# Patient Record
Sex: FEMALE | Race: WHITE
[De-identification: names, ages, dates, MRNs, and addresses within clinical notes are randomized per-mention and may not be internally consistent; named-entity substitution may affect disease eponyms.]

---

## 2017-07-19 ENCOUNTER — HOSPITAL ENCOUNTER (EMERGENCY)
Dept: HOSPITAL 10 - E/R | Age: 72
Discharge: HOME | End: 2017-07-19
Payer: MEDICARE

## 2017-07-19 VITALS
WEIGHT: 186.29 LBS | WEIGHT: 186.29 LBS | HEIGHT: 63 IN | BODY MASS INDEX: 33.01 KG/M2 | HEIGHT: 63 IN | BODY MASS INDEX: 33.01 KG/M2

## 2017-07-19 VITALS
SYSTOLIC BLOOD PRESSURE: 135 MMHG | TEMPERATURE: 98.1 F | RESPIRATION RATE: 18 BRPM | DIASTOLIC BLOOD PRESSURE: 60 MMHG | HEART RATE: 76 BPM

## 2017-07-19 DIAGNOSIS — N30.00: ICD-10-CM

## 2017-07-19 DIAGNOSIS — R10.30: Primary | ICD-10-CM

## 2017-07-19 DIAGNOSIS — I10: ICD-10-CM

## 2017-07-19 LAB
ADD SCAN DIFF: NO
ADD UMIC: YES
ALBUMIN SERPL-MCNC: 4.1 G/DL (ref 3.3–4.9)
ALBUMIN/GLOB SERPL: 0.95 {RATIO}
ALP SERPL-CCNC: 56 IU/L (ref 42–121)
ALT SERPL-CCNC: 19 IU/L (ref 13–69)
ANION GAP SERPL CALC-SCNC: 21 MMOL/L (ref 8–16)
AST SERPL-CCNC: 21 IU/L (ref 15–46)
BASOPHILS # BLD AUTO: 0 10^3/UL (ref 0–0.1)
BASOPHILS NFR BLD: 0.7 % (ref 0–2)
BILIRUB DIRECT SERPL-MCNC: 0 MG/DL (ref 0–0.2)
BILIRUB SERPL-MCNC: 0.3 MG/DL (ref 0.2–1.3)
BUN SERPL-MCNC: 17 MG/DL (ref 7–20)
CALCIUM SERPL-MCNC: 9.9 MG/DL (ref 8.4–10.2)
CHLORIDE SERPL-SCNC: 103 MMOL/L (ref 97–110)
CO2 SERPL-SCNC: 26 MMOL/L (ref 21–31)
COLOR UR: YELLOW
CREAT SERPL-MCNC: 1 MG/DL (ref 0.44–1)
EOSINOPHIL # BLD: 0.3 10^3/UL (ref 0–0.5)
EOSINOPHIL NFR BLD: 4.9 % (ref 0–7)
ERYTHROCYTE [DISTWIDTH] IN BLOOD BY AUTOMATED COUNT: 13.7 % (ref 11.5–14.5)
GLOBULIN SER-MCNC: 4.3 G/DL (ref 1.3–3.2)
GLUCOSE SERPL-MCNC: 133 MG/DL (ref 70–220)
GLUCOSE UR STRIP-MCNC: NEGATIVE MG/DL
HCT VFR BLD CALC: 39.1 % (ref 37–47)
HGB BLD-MCNC: 13 G/DL (ref 12–16)
KETONES UR STRIP.AUTO-MCNC: (no result) MG/DL
LYMPHOCYTES # BLD AUTO: 1.3 10^3/UL (ref 0.8–2.9)
LYMPHOCYTES NFR BLD AUTO: 21.5 % (ref 15–51)
MCH RBC QN AUTO: 29.5 PG (ref 29–33)
MCHC RBC AUTO-ENTMCNC: 33.2 G/DL (ref 32–37)
MCV RBC AUTO: 88.7 FL (ref 82–101)
MONOCYTES # BLD: 0.6 10^3/UL (ref 0.3–0.9)
MONOCYTES NFR BLD: 10.6 % (ref 0–11)
MUCOUS THREADS #/AREA URNS HPF: (no result) /HPF
NEUTROPHILS # BLD: 3.6 10^3/UL (ref 1.6–7.5)
NEUTROPHILS NFR BLD AUTO: 61.8 % (ref 39–77)
NITRITE UR QL STRIP.AUTO: NEGATIVE MG/DL
NRBC # BLD MANUAL: 0 10^3/UL (ref 0–0)
NRBC BLD QL: 0 /100WBC (ref 0–0)
PLATELET # BLD: 192 10^3/UL (ref 140–415)
PMV BLD AUTO: 9.9 FL (ref 7.4–10.4)
POTASSIUM SERPL-SCNC: 3.7 MMOL/L (ref 3.5–5.1)
PROT SERPL-MCNC: 8.4 G/DL (ref 6.1–8.1)
RBC # BLD AUTO: 4.41 10^6/UL (ref 4.2–5.4)
RBC # UR AUTO: (no result) MG/DL
SODIUM SERPL-SCNC: 146 MMOL/L (ref 135–144)
UR ASCORBIC ACID: NEGATIVE MG/DL
UR BILIRUBIN (DIP): NEGATIVE MG/DL
UR CLARITY: (no result)
UR PH (DIP): 6 (ref 5–9)
UR RBC: 3 /HPF (ref 0–5)
UR SPECIFIC GRAVITY (DIP): 1.02 (ref 1–1.03)
UR TOTAL PROTEIN (DIP): (no result) MG/DL
UROBILINOGEN UR STRIP-ACNC: (no result) MG/DL
WBC # BLD AUTO: 5.9 10^3/UL (ref 4.8–10.8)
WBC # UR STRIP: (no result) LEU/UL

## 2017-07-19 PROCEDURE — 99283 EMERGENCY DEPT VISIT LOW MDM: CPT

## 2017-07-19 PROCEDURE — 80053 COMPREHEN METABOLIC PANEL: CPT

## 2017-07-19 PROCEDURE — 87086 URINE CULTURE/COLONY COUNT: CPT

## 2017-07-19 PROCEDURE — P9612 CATHETERIZE FOR URINE SPEC: HCPCS

## 2017-07-19 PROCEDURE — 81001 URINALYSIS AUTO W/SCOPE: CPT

## 2017-07-19 PROCEDURE — 83690 ASSAY OF LIPASE: CPT

## 2017-07-19 PROCEDURE — 85025 COMPLETE CBC W/AUTO DIFF WBC: CPT

## 2017-07-19 NOTE — ERD
ER Documentation


Chief Complaint


Date/Time


DATE: 7/19/17 


TIME: 17:21


Chief Complaint


BIB [facility] FROM HOME FOR ABD PAIN X 1 WEEK





HPI


This is a 72-year-old female who presents to the emergency room with her 

caregiver for evaluation of lower abdominal pain for the past week.  History is 

obtained from the patient's caregivers the patient does have a history of 

dementia and is not able to give a detailed history.  According to the patient'

s caregiver this patient does get intermittent abdominal pain usually from 

eating too fast.  She was concerned because the patient pointed at her abdomen 

a few times this week.  No fevers, vomiting or diarrhea.  Patient was brought 

to the ER for evaluation.





ROS


All systems reviewed and are negative except as per history of present illness.





Medications


Home Meds


Reported Medications


Amlodipine Besylate* (Norvasc*) 5 Mg Tablet, 5 MG PO DAILY, TAB


   7/19/17


Losartan Potassium* (Cozaar*) 100 Mg Tablet, 100 MG PO DAILY, #30 TAB


   7/19/17


Lansoprazole* (Lansoprazole*) 30 Mg Capsule.dr, 30 MG PO QAM, CAP


   7/19/17


Divalproex Sodium* (Divalproex Sodium*) 500 Mg Tablet.dr, 500 MG PO TID, #90 TAB


   7/19/17


Discontinued Reported Medications


Hydrochlorothiazide* (Hydrochlorothiazide*) 25 Mg Tab, 25 MG PO DAILY, #30 TAB


   12/12/16


Timolol Maleate* (Timoptic*) 0.25%-5ml Opht, 2 DROP BOTH EYES BID, #1 EA


   12/12/16


Losartan Potassium* (Losartan Potassium*) 100 Mg Tablet, 100 MG PO DAILY, TAB


   12/12/16


Lansoprazole* (Lansoprazole*) 30 Mg Capsule.dr, 30 MG PO DAILY, CAP


   12/12/16


Divalproex Sodium* (Divalproex ER*) 500 Mg Tab.er.24h, 500 MG PO TID


   1/28/16


Discontinued Scripts


Ondansetron (Ondansetron Odt) 4 Mg Tab.rapdis, 4 MG PO Q6H Y for NAUSEA AND/OR 

VOMITING, #10 TAB


   Prov:VIRIDIANA CONTRERAS MD         12/12/16


Loperamide Hcl* (Imodium*) 2 Mg Capsule, 2 MG PO .AFTER EA LOOSE BM Y for 

DIARRHEA, #10 TAB


   Prov:VIRIDIANA CONTRERAS MD         12/12/16


Ciprofloxacin Hcl* (Ciprofloxacin Hcl*) 500 Mg Tablet, 500 MG PO BID for 3 Days

, TAB


   Prov:VIRIDIANA CONTRERAS MD         12/12/16





Allergies


Allergies:  


Coded Allergies:  


     No Known Allergy (Unverified , 7/19/17)





PMhx/Soc


History of Surgery:  Yes (Gallbladder sx, hernia repair)


Anesthesia Reaction:  No


Hx Neurological Disorder:  No


Hx Respiratory Disorders:  No


Hx Cardiac Disorders:  Yes (HTN)


Hx Psychiatric Problems:  Yes (Bipolar and dependent personality)


Hx Miscellaneous Medical Probl:  Yes (Seizure )


Hx Alcohol Use:  No


Hx Substance Use:  No


Hx Tobacco Use:  No


Smoking Status:  Never smoker





Physical Exam


Vitals





Vital Signs








  Date Time  Temp Pulse Resp B/P Pulse Ox O2 Delivery O2 Flow Rate FiO2


 


7/19/17 13:42 98.1 85 18 146/62 98   








Physical Exam


Const: Pleasant elderly female, no acute distress


Head:   Atraumatic 


Eyes:    Normal Conjunctiva


ENT:    Normal External Ears, Nose and Mouth.


Neck:               Full range of motion..~ No meningismus.


Resp:    Clear to auscultation bilaterally


Cardio:    Regular rate and rhythm, no murmurs


Abd:    Soft, non tender, non distended. Normal bowel sounds


Skin:    No petechiae or rashes


Back:    No midline or flank tenderness


Ext:    No cyanosis, or edema


Neur:    Awake and alert


Psych:    Normal Mood and Affect


Result Diagram:  


7/19/17 1417                                                                   

             7/19/17 1417





Results 24 hrs





 Laboratory Tests








Test


  7/19/17


14:17 7/19/17


16:20


 


White Blood Count 5.910^3/ul  


 


Red Blood Count 4.4110^6/ul  


 


Hemoglobin 13.0g/dl  


 


Hematocrit 39.1%  


 


Mean Corpuscular Volume 88.7fl  


 


Mean Corpuscular Hemoglobin 29.5pg  


 


Mean Corpuscular Hemoglobin


Concent 33.2g/dl 


  


 


 


Red Cell Distribution Width 13.7%  


 


Platelet Count 04135^3/UL  


 


Mean Platelet Volume 9.9fl  


 


Neutrophils % 61.8%  


 


Lymphocytes % 21.5%  


 


Monocytes % 10.6%  


 


Eosinophils % 4.9%  


 


Basophils % 0.7%  


 


Nucleated Red Blood Cells % 0.0/100WBC  


 


Neutrophils # 3.610^3/ul  


 


Lymphocytes # 1.310^3/ul  


 


Monocytes # 0.610^3/ul  


 


Eosinophils # 0.310^3/ul  


 


Basophils # 0.010^3/ul  


 


Nucleated Red Blood Cells # 0.010^3/ul  


 


Sodium Level 146mmol/L  


 


Potassium Level 3.7mmol/L  


 


Chloride Level 103mmol/L  


 


Carbon Dioxide Level 26mmol/L  


 


Anion Gap 21  


 


Blood Urea Nitrogen 17mg/dl  


 


Creatinine 1.00mg/dl  


 


Glucose Level 133mg/dl  


 


Calcium Level 9.9mg/dl  


 


Total Bilirubin 0.3mg/dl  


 


Direct Bilirubin 0.00mg/dl  


 


Indirect Bilirubin 0.3mg/dl  


 


Aspartate Amino Transf


(AST/SGOT) 21IU/L 


  


 


 


Alanine Aminotransferase


(ALT/SGPT) 19IU/L 


  


 


 


Alkaline Phosphatase 56IU/L  


 


Total Protein 8.4g/dl  


 


Albumin 4.1g/dl  


 


Globulin 4.30g/dl  


 


Albumin/Globulin Ratio 0.95  


 


Lipase 48U/L  


 


Urine Color  YELLOW 


 


Urine Clarity


  


  SLIGHTLY


CLOUDY


 


Urine pH  6.0 


 


Urine Specific Gravity  1.021 


 


Urine Ketones  TRACEmg/dL 


 


Urine Nitrite  NEGATIVEmg/dL 


 


Urine Bilirubin  NEGATIVEmg/dL 


 


Urine Urobilinogen  2+mg/dL 


 


Urine Leukocyte Esterase  2+Serog/ul 


 


Urine Microscopic RBC  3/HPF 


 


Urine Microscopic WBC  6/HPF 


 


Urine Mucus  FEW/HPF 


 


Urine Hemoglobin  1+mg/dL 


 


Urine Glucose  NEGATIVEmg/dL 


 


Urine Total Protein  1+mg/dl 











Procedures/MDM


This 72-year-old female was brought to the emergency room for evaluation of 

abdominal pain.  A detailed history was obtainable from this patient.  We did 

obtain lab work which are within normal limits and urinalysis was obtained 

which does show 2+ leukocyte esterase.  Given this patient's age and pain the 

patient will be given a prescription for ciprofloxacin to take over the course 

of the next 7 days.  The patient did have a urine culture obtained here in the 

emergency room.





Departure


Diagnosis:  


 Primary Impression:  


 Abdominal pain


 Additional Impression:  


 Acute cystitis


Condition:  Stable











JEFERSONEJREMY SALGUERO Jul 19, 2017 17:22

## 2017-11-14 ENCOUNTER — HOSPITAL ENCOUNTER (EMERGENCY)
Dept: HOSPITAL 10 - E/R | Age: 72
Discharge: HOME | End: 2017-11-14
Payer: MEDICARE

## 2017-11-14 VITALS
RESPIRATION RATE: 18 BRPM | TEMPERATURE: 98.3 F | HEART RATE: 55 BPM | SYSTOLIC BLOOD PRESSURE: 200 MMHG | DIASTOLIC BLOOD PRESSURE: 85 MMHG

## 2017-11-14 VITALS
WEIGHT: 176.37 LBS | BODY MASS INDEX: 32.46 KG/M2 | HEIGHT: 62 IN | HEIGHT: 62 IN | BODY MASS INDEX: 32.46 KG/M2 | WEIGHT: 176.37 LBS

## 2017-11-14 DIAGNOSIS — I10: ICD-10-CM

## 2017-11-14 DIAGNOSIS — N39.0: Primary | ICD-10-CM

## 2017-11-14 DIAGNOSIS — K57.32: ICD-10-CM

## 2017-11-14 LAB
ALBUMIN SERPL-MCNC: 3.6 G/DL (ref 3.3–4.9)
ALBUMIN/GLOB SERPL: 1.05 {RATIO}
ALP SERPL-CCNC: 53 IU/L (ref 42–121)
ALT SERPL-CCNC: 20 IU/L (ref 13–69)
ANION GAP SERPL CALC-SCNC: 13 MMOL/L (ref 8–16)
AST SERPL-CCNC: 22 IU/L (ref 15–46)
BASOPHILS # BLD AUTO: 0 10^3/UL (ref 0–0.1)
BASOPHILS NFR BLD: 0.5 % (ref 0–2)
BILIRUB DIRECT SERPL-MCNC: 0 MG/DL (ref 0–0.2)
BILIRUB SERPL-MCNC: 0.1 MG/DL (ref 0.2–1.3)
BUN SERPL-MCNC: 23 MG/DL (ref 7–20)
CALCIUM SERPL-MCNC: 8.8 MG/DL (ref 8.4–10.2)
CHLORIDE SERPL-SCNC: 106 MMOL/L (ref 97–110)
CO2 SERPL-SCNC: 27 MMOL/L (ref 21–31)
CREAT SERPL-MCNC: 0.88 MG/DL (ref 0.44–1)
EOSINOPHIL # BLD: 0.1 10^3/UL (ref 0–0.5)
EOSINOPHIL NFR BLD: 1.4 % (ref 0–7)
ERYTHROCYTE [DISTWIDTH] IN BLOOD BY AUTOMATED COUNT: 13.7 % (ref 11.5–14.5)
GLOBULIN SER-MCNC: 3.4 G/DL (ref 1.3–3.2)
GLUCOSE SERPL-MCNC: 91 MG/DL (ref 70–220)
HCT VFR BLD CALC: 33.7 % (ref 37–47)
HGB BLD-MCNC: 11.1 G/DL (ref 12–16)
LYMPHOCYTES # BLD AUTO: 1.9 10^3/UL (ref 0.8–2.9)
LYMPHOCYTES NFR BLD AUTO: 33.6 % (ref 15–51)
MCH RBC QN AUTO: 29.4 PG (ref 29–33)
MCHC RBC AUTO-ENTMCNC: 32.9 G/DL (ref 32–37)
MCV RBC AUTO: 89.2 FL (ref 82–101)
MONOCYTES # BLD: 0.4 10^3/UL (ref 0.3–0.9)
MONOCYTES NFR BLD: 7.6 % (ref 0–11)
NEUTROPHILS # BLD: 3.2 10^3/UL (ref 1.6–7.5)
NEUTROPHILS NFR BLD AUTO: 56.4 % (ref 39–77)
NRBC # BLD MANUAL: 0 10^3/UL (ref 0–0)
NRBC BLD AUTO-RTO: 0 /100WBC (ref 0–0)
PLATELET # BLD: 200 10^3/UL (ref 140–415)
PMV BLD AUTO: 10.1 FL (ref 7.4–10.4)
POTASSIUM SERPL-SCNC: 4.1 MMOL/L (ref 3.5–5.1)
PROT SERPL-MCNC: 7 G/DL (ref 6.1–8.1)
RBC # BLD AUTO: 3.78 10^6/UL (ref 4.2–5.4)
SODIUM SERPL-SCNC: 142 MMOL/L (ref 135–144)
URINE BLOOD (DIP) POC: (no result)
WBC # BLD AUTO: 5.6 10^3/UL (ref 4.8–10.8)

## 2017-11-14 PROCEDURE — P9612 CATHETERIZE FOR URINE SPEC: HCPCS

## 2017-11-14 PROCEDURE — 74176 CT ABD & PELVIS W/O CONTRAST: CPT

## 2017-11-14 PROCEDURE — 85025 COMPLETE CBC W/AUTO DIFF WBC: CPT

## 2017-11-14 PROCEDURE — 80053 COMPREHEN METABOLIC PANEL: CPT

## 2017-11-14 PROCEDURE — 99285 EMERGENCY DEPT VISIT HI MDM: CPT

## 2017-11-14 PROCEDURE — 96375 TX/PRO/DX INJ NEW DRUG ADDON: CPT

## 2017-11-14 PROCEDURE — 83690 ASSAY OF LIPASE: CPT

## 2017-11-14 PROCEDURE — 81003 URINALYSIS AUTO W/O SCOPE: CPT

## 2017-11-14 PROCEDURE — 36415 COLL VENOUS BLD VENIPUNCTURE: CPT

## 2017-11-14 PROCEDURE — 87086 URINE CULTURE/COLONY COUNT: CPT

## 2017-11-14 PROCEDURE — 96374 THER/PROPH/DIAG INJ IV PUSH: CPT

## 2017-11-14 NOTE — ERD
ER Documentation


Chief Complaint


Chief Complaint


AP SINCE 0500 AM TODAY





HPI


The patient is a 72-year-old female, stenting to the ER because of acute on 

chronic abdominal pain, was about 5 AM today.. She was here recently for acute 

cystitis discharged with Cipro.  She denies fever, chest pain, back pain, 

vomiting, complaints of dysuria, denies diarrhea or constipation.  S'he does 

not smoke nor drink





Past Medical history: Epilepsy, HTN


Past Surgical history: None





ROS


All systems reviewed and are negative except as per history of present illness.





Medications


Home Meds


Active Scripts


Nitrofurantoin Monohyd Macrocr* (Macrobid*) 100 Mg Capsr, 100 MG PO BID for 7 

Days, CAP


   Prov:VIRIDIANA CONTRERAS MD         17


Ciprofloxacin Hcl* (Ciprofloxacin Hcl*) 500 Mg Tablet, 500 MG PO BID for 7 Days

, TAB


   Prov:JEREMY GOVEA DO         17


Reported Medications


Amlodipine Besylate* (Norvasc*) 5 Mg Tablet, 5 MG PO DAILY, TAB


   17


Losartan Potassium* (Cozaar*) 100 Mg Tablet, 100 MG PO DAILY, #30 TAB


   17


Lansoprazole* (Lansoprazole*) 30 Mg Capsule.dr, 30 MG PO QAM, CAP


   17


Divalproex Sodium* (Divalproex Sodium*) 500 Mg Tablet.dr, 500 MG PO TID, #90 TAB


   17





Allergies


Allergies:  


Coded Allergies:  


     No Known Allergy (Unverified , 17)





PMhx/Soc


History of Surgery:  Yes (Gallbladder sx, hernia repair)


Anesthesia Reaction:  No


Hx Neurological Disorder:  No


Hx Respiratory Disorders:  No


Hx Cardiac Disorders:  Yes (HTN)


Hx Psychiatric Problems:  Yes (Bipolar and dependent personality)


Hx Miscellaneous Medical Probl:  Yes (Seizure )


Hx Alcohol Use:  No


Hx Substance Use:  No


Hx Tobacco Use:  No


Smoking Status:  Never smoker





Physical Exam


Vitals





Vital Signs








  Date Time  Temp Pulse Resp B/P Pulse Ox O2 Delivery O2 Flow Rate FiO2


 


17 20:12 98.3 57  186/80 100 Room Air  


 


17 17:28 98.1 60 18 220/85 99   








Physical Exam


 Const:      No acute distress.


 Head:        Atraumatic.


 Eyes:       Normal Conjunctiva.


 ENT:         Normal External Ears, Nose and Mouth.


 Neck:        Full range of motion.  No meningismus.


 Resp:         Clear to auscultation bilaterally.


 Cardio:       Regular rate and rhythm.


 Abd:         Soft,  obese, normal bowel sounds, non tender.


 Skin:         No petechiae or rashes.


 Back:        No midline or flank tenderness.


 Ext:          No cyanosis, or edema.


 Neur:        Awake and alert. No focal deficit


 Psych:        Normal Mood and Affect.


Result Diagram:  


17 1743                                                                  

              17 1743





Results 24 hrs





 Laboratory Tests








Test


  17


17:43 17


19:49


 


White Blood Count 5.610^3/ul  


 


Red Blood Count 3.7810^6/ul  


 


Hemoglobin 11.1g/dl  


 


Hematocrit 33.7%  


 


Mean Corpuscular Volume 89.2fl  


 


Mean Corpuscular Hemoglobin 29.4pg  


 


Mean Corpuscular Hemoglobin


Concent 32.9g/dl 


  


 


 


Red Cell Distribution Width 13.7%  


 


Platelet Count 70077^3/UL  


 


Mean Platelet Volume 10.1fl  


 


Neutrophils % 56.4%  


 


Lymphocytes % 33.6%  


 


Monocytes % 7.6%  


 


Eosinophils % 1.4%  


 


Basophils % 0.5%  


 


Nucleated Red Blood Cells % 0.0/100WBC  


 


Neutrophils # 3.210^3/ul  


 


Lymphocytes # 1.910^3/ul  


 


Monocytes # 0.410^3/ul  


 


Eosinophils # 0.110^3/ul  


 


Basophils # 0.010^3/ul  


 


Nucleated Red Blood Cells # 0.010^3/ul  


 


Sodium Level 142mmol/L  


 


Potassium Level 4.1mmol/L  


 


Chloride Level 106mmol/L  


 


Carbon Dioxide Level 27mmol/L  


 


Anion Gap 13  


 


Blood Urea Nitrogen 23mg/dl  


 


Creatinine 0.88mg/dl  


 


Glucose Level 91mg/dl  


 


Calcium Level 8.8mg/dl  


 


Total Bilirubin 0.1mg/dl  


 


Direct Bilirubin 0.00mg/dl  


 


Indirect Bilirubin 0.1mg/dl  


 


Aspartate Amino Transf


(AST/SGOT) 22IU/L 


  


 


 


Alanine Aminotransferase


(ALT/SGPT) 20IU/L 


  


 


 


Alkaline Phosphatase 53IU/L  


 


Total Protein 7.0g/dl  


 


Albumin 3.6g/dl  


 


Globulin 3.40g/dl  


 


Albumin/Globulin Ratio 1.05  


 


Lipase 99U/L  


 


Bedside Urine pH (LAB)  6.5 


 


Bedside Urine Protein (LAB)  2+ 


 


Bedside Urine Glucose (UA)  Negative 


 


Bedside Urine Ketones (LAB)  Trace 


 


Bedside Urine Blood  3+ 


 


Bedside Urine Nitrite (LAB)  Negative 


 


Bedside Urine Leukocyte


Esterase (L 


  1+ 


 











Procedures/MDM





 [place] [id number]


 Radiology Main Line: [phone]





 DIAGNOSTIC IMAGING REPORT





 Patient: AMOR YOUNG   : 1945   Age: 72  Sex: F                    

    


 MR #:    [id number]   Acct #:   [id number]    DOS: 17 1739


 Ordering MD: VIRIDIANA CONTRERAS MD   Location:  E/R   Room/Bed:                  

                          


 








PROCEDURE:   CT Abdomen and Pelvis without contrast 


 


CLINICAL INDICATION:  Abdominal pain


 


TECHNIQUE:   Transaxial images were obtained through the abdomen and pelvis on 

a multi-slice scanner without the intravenous contrast administration.  No oral 

contrast had previously been given. Sagittal and coronal re-formations were 

subsequently reconstructed.


 


One or more of the following dose reduction techniques were used:


- Automated exposure control.


- Adjustment of the mA and/or kV according to patient size.


- Use of iterative reconstruction technique.


 


Radiation dose: CTDIvol = 22.70 mGy; DLP = 1281.18 mGy-cm.


 


COMPARISON:   2016 


 


FINDINGS:


 


Lung bases: The visualized lung bases appear unremarkable.


 


Liver: Normal in size and in attenuation. There is no focal lesion.


 


Gallbladder: Surgical clips are again seen in the gallbladder fossa.


 


Bile ducts: The intra and extrahepatic bile ducts are normal in caliber. 


 


Pancreas: Appears normal with no mass or inflammation evident.


 


Spleen: Normal in size with no focal lesion.


 


Adrenals: Normal with no mass identified.


 


Kidneys, ureters and bladder: The kidneys are lobulated in contour compatible 

with cortical scarring. A 3 mm nonobstructing nephrolith is seen within the 

inferior pole of the left kidney. There is again no evidence of urinary outflow 

obstruction. The ureters and bladder appear unremarkable.


 


Reproductive organs: The uterus is postmenopausal in size. No adnexal mass is 

evident.


 


Stomach and bowel: The stomach appears unremarkable. There are diverticuli 

scattered throughout the colon. There is slight stranding within the fat 

adjacent to the proximal sigmoid colon which may represent mild changes of 

diverticulitis, minimally increased over the previous. There is no evidence of 

bowel obstruction.


 


Appendix: There are no findings to suggest appendicitis.


 


Peritoneum: No free intraperitoneal fluid or air is identified.


 


Aorta: There is atherosclerotic vascular calcification but no abdominal aortic 

aneurysm is evident.


 


IVC: Unremarkable.


 


Lymph nodes: A few mesenteric nodes up to 7 mm in short diameter are identified 

in the 9 mm in short diameter left external iliac chain node is noted.


 


Osseous structures: Diffuse degenerative spine changes are again noted.


 


IMPRESSION: 


1.  Scattered diverticuli are again seen throughout the colon and there is now 

minimal stranding medial to the proximal sigmoid colon increased over the 

previous a raising the possibility of mild diverticulitis. There is no evidence 

of bowel obstruction.


2.  There is persistent renal cortical scarring and a 3 mm nonobstructing 

nephrolith is now seen within the inferior pole fabio of the left kidney but 

there is no evidence of urinary outflow obstruction or ureterolithiasis within 

normal appearing bladder.


3.  There is no longer free intraperitoneal fluid and no free air is evident.


6.  Status post cholecystectomy with no bile duct dilatation or pancreatic 

pathology evident.


7.  Non-Q mesenteric nodes are evident up to 7 mm in diameter and 9 mm in short 

diameter left external iliac chain node is evident.


8.  Persistent atherosclerotic vascular calcification.


9.  Persistent degenerative spine changes.


_____________________________________________ 


Physician Jose           Date    Time 


Electronically viewed and signed by Physician Jose on 2017 20:15 


 


D:  2017 20:15  T:  2017 20:15


RH/





CC: VIRIDIANA CONTRERAS MD





MEDICAL MAKING DECISION: The patient is a 72-year-old female, presenting to the 

ER because of acute cystitis, suspected acute diverticulitis, acute accelerated 

hypertension.  She is stable for outpatient follow-up


She was treated with morphine 2 mg IV for pain, Zofran 4 mg for nausea with 

good response. Her blood pressures improved.





The differential diagnoses considered include but are not limited to 

cholelithiasis, cholecystitis, cystitis, pancreatitis, hepatitis, gastritis, 

peptic ulcer disease, gastric ulcer, appendicitis, diverticulitis, cholangitis, 

choledocholithiasis, partial small bowel obstruction.





Departure


Diagnosis:  


 Primary Impression:  


 UTI (urinary tract infection)


 Additional Impressions:  


 Diverticulitis


 Accelerated hypertension


Condition:  Good


Patient Instructions:  Understanding Urinary Tract Infections (UTIs)





Additional Instructions:  


She was discharged with Cipro, Flagyl, Ultram





I discussed the findings with the patient. I advised the patient to follow-up 

with the primary physician in about 1-2 days, sooner if needed and return if 

any concern.





Disclaimer: Inadvertent spelling and grammatical errors are likely due to EHR/

dictation software use and do not reflect on the overall quality of patient 

care. Also, please note that the electronic time recorded on this note does not 

necessarily reflect the actual time of the patient encounter.





Call your primary care doctor TOMORROW for an appointment during the next 2-3 

days.See the doctor sooner or return here if your condition worsens before your 

appointment time.











VIRIDIANA CONTRERAS MD 2017 20:23

## 2017-11-14 NOTE — RADRPT
PROCEDURE:   CT Abdomen and Pelvis without contrast 

 

CLINICAL INDICATION:  Abdominal pain

 

TECHNIQUE:   Transaxial images were obtained through the abdomen and pelvis on a multi-slice scanner
 without the intravenous contrast administration.  No oral contrast had previously been given. Sagit
bogdan and coronal re-formations were subsequently reconstructed.

 

One or more of the following dose reduction techniques were used:

- Automated exposure control.

- Adjustment of the mA and/or kV according to patient size.

- Use of iterative reconstruction technique.

 

Radiation dose: CTDIvol = 22.70 mGy; DLP = 1281.18 mGy-cm.

 

COMPARISON:   12/12/2016 

 

FINDINGS:

 

Lung bases: The visualized lung bases appear unremarkable.

 

Liver: Normal in size and in attenuation. There is no focal lesion.

 

Gallbladder: Surgical clips are again seen in the gallbladder fossa.

 

Bile ducts: The intra and extrahepatic bile ducts are normal in caliber. 

 

Pancreas: Appears normal with no mass or inflammation evident.

 

Spleen: Normal in size with no focal lesion.

 

Adrenals: Normal with no mass identified.

 

Kidneys, ureters and bladder: The kidneys are lobulated in contour compatible with cortical scarring
. A 3 mm nonobstructing nephrolith is seen within the inferior pole of the left kidney. There is aga
in no evidence of urinary outflow obstruction. The ureters and bladder appear unremarkable.

 

Reproductive organs: The uterus is postmenopausal in size. No adnexal mass is evident.

 

Stomach and bowel: The stomach appears unremarkable. There are diverticuli scattered throughout the 
colon. There is slight stranding within the fat adjacent to the proximal sigmoid colon which may rep
resent mild changes of diverticulitis, minimally increased over the previous. There is no evidence o
f bowel obstruction.

 

Appendix: There are no findings to suggest appendicitis.

 

Peritoneum: No free intraperitoneal fluid or air is identified.

 

Aorta: There is atherosclerotic vascular calcification but no abdominal aortic aneurysm is evident.

 

IVC: Unremarkable.

 

Lymph nodes: A few mesenteric nodes up to 7 mm in short diameter are identified in the 9 mm in short
 diameter left external iliac chain node is noted.

 

Osseous structures: Diffuse degenerative spine changes are again noted.

 

IMPRESSION: 

1.  Scattered diverticuli are again seen throughout the colon and there is now minimal stranding med
ial to the proximal sigmoid colon increased over the previous a raising the possibility of mild dive
rticulitis. There is no evidence of bowel obstruction.

2.  There is persistent renal cortical scarring and a 3 mm nonobstructing nephrolith is now seen wit
hin the inferior pole fabio of the left kidney but there is no evidence of urinary outflow obstructi
on or ureterolithiasis within normal appearing bladder.

3.  There is no longer free intraperitoneal fluid and no free air is evident.

6.  Status post cholecystectomy with no bile duct dilatation or pancreatic pathology evident.

7.  Non-Q mesenteric nodes are evident up to 7 mm in diameter and 9 mm in short diameter left extern
al iliac chain node is evident.

8.  Persistent atherosclerotic vascular calcification.

9.  Persistent degenerative spine changes.

_____________________________________________ 

Physician Jose           Date    Time 

Electronically viewed and signed by Physician Jose on 11/14/2017 20:15 

 

D:  11/14/2017 20:15  T:  11/14/2017 20:15

RH/

## 2018-01-12 ENCOUNTER — HOSPITAL ENCOUNTER (INPATIENT)
Dept: HOSPITAL 54 - ER | Age: 73
LOS: 2 days | Discharge: HOME | DRG: 392 | End: 2018-01-14
Attending: INTERNAL MEDICINE | Admitting: INTERNAL MEDICINE
Payer: MEDICARE

## 2018-01-12 VITALS — WEIGHT: 193 LBS | BODY MASS INDEX: 35.51 KG/M2 | HEIGHT: 62 IN

## 2018-01-12 DIAGNOSIS — K76.0: ICD-10-CM

## 2018-01-12 DIAGNOSIS — G40.909: ICD-10-CM

## 2018-01-12 DIAGNOSIS — Z90.49: ICD-10-CM

## 2018-01-12 DIAGNOSIS — E66.9: ICD-10-CM

## 2018-01-12 DIAGNOSIS — I10: ICD-10-CM

## 2018-01-12 DIAGNOSIS — K57.32: Primary | ICD-10-CM

## 2018-01-12 DIAGNOSIS — E44.0: ICD-10-CM

## 2018-01-12 DIAGNOSIS — Z79.899: ICD-10-CM

## 2018-01-12 LAB
ALBUMIN SERPL BCP-MCNC: 3.5 G/DL (ref 3.4–5)
ALP SERPL-CCNC: 75 U/L (ref 46–116)
ALT SERPL W P-5'-P-CCNC: 8 U/L (ref 12–78)
APPEARANCE UR: CLEAR
AST SERPL W P-5'-P-CCNC: 17 U/L (ref 15–37)
BASOPHILS # BLD AUTO: 0.2 /CMM (ref 0–0.2)
BASOPHILS NFR BLD AUTO: 2.4 % (ref 0–2)
BILIRUB DIRECT SERPL-MCNC: 0 MG/DL (ref 0–0.2)
BILIRUB SERPL-MCNC: 0.3 MG/DL (ref 0.2–1)
BILIRUB UR QL STRIP: (no result)
BUN SERPL-MCNC: 32 MG/DL (ref 7–18)
CALCIUM SERPL-MCNC: 9 MG/DL (ref 8.5–10.1)
CHLORIDE SERPL-SCNC: 106 MMOL/L (ref 98–107)
CO2 SERPL-SCNC: 29 MMOL/L (ref 21–32)
COLOR UR: (no result)
CREAT SERPL-MCNC: 1.1 MG/DL (ref 0.6–1.3)
EOSINOPHIL # BLD AUTO: 0.1 /CMM (ref 0–0.7)
EOSINOPHIL NFR BLD AUTO: 2.3 % (ref 0–6)
GLUCOSE SERPL-MCNC: 122 MG/DL (ref 74–106)
GLUCOSE UR STRIP-MCNC: NEGATIVE MG/DL
HCT VFR BLD AUTO: 34 % (ref 33–45)
HGB BLD-MCNC: 11.6 G/DL (ref 11.5–14.8)
HGB UR QL STRIP: NEGATIVE ERY/UL
KETONES UR STRIP-MCNC: (no result) MG/DL
LEUKOCYTE ESTERASE UR QL STRIP: (no result)
LIPASE SERPL-CCNC: 167 U/L (ref 73–393)
LYMPHOCYTES NFR BLD AUTO: 2 /CMM (ref 0.8–4.8)
LYMPHOCYTES NFR BLD AUTO: 30.5 % (ref 20–44)
MCH RBC QN AUTO: 29 PG (ref 26–33)
MCHC RBC AUTO-ENTMCNC: 34 G/DL (ref 31–36)
MCV RBC AUTO: 86 FL (ref 82–100)
MONOCYTES NFR BLD AUTO: 0.6 /CMM (ref 0.1–1.3)
MONOCYTES NFR BLD AUTO: 8.6 % (ref 2–12)
NEUTROPHILS # BLD AUTO: 3.5 /CMM (ref 1.8–8.9)
NEUTROPHILS NFR BLD AUTO: 56.2 % (ref 43–81)
NITRITE UR QL STRIP: NEGATIVE
PH UR STRIP: 6 [PH] (ref 5–8)
PLATELET # BLD AUTO: 298 /CMM (ref 150–450)
POTASSIUM SERPL-SCNC: 4.6 MMOL/L (ref 3.5–5.1)
PROT SERPL-MCNC: 7.8 G/DL (ref 6.4–8.2)
PROT UR QL STRIP: (no result) MG/DL
RBC # BLD AUTO: 4 MIL/UL (ref 4–5.2)
RBC #/AREA URNS HPF: (no result) /HPF (ref 0–2)
RDW COEFFICIENT OF VARIATION: 13.7 (ref 11.5–15)
SODIUM SERPL-SCNC: 139 MMOL/L (ref 136–145)
TROPONIN I SERPL-MCNC: < 0.017 NG/ML (ref 0–0.06)
UROBILINOGEN UR STRIP-MCNC: 1 EU/DL
WBC #/AREA URNS HPF: (no result) /HPF (ref 0–3)
WBC NRBC COR # BLD AUTO: 6.4 K/UL (ref 4.3–11)

## 2018-01-12 PROCEDURE — Z7610: HCPCS

## 2018-01-12 PROCEDURE — A4606 OXYGEN PROBE USED W OXIMETER: HCPCS

## 2018-01-12 PROCEDURE — A4216 STERILE WATER/SALINE, 10 ML: HCPCS

## 2018-01-13 VITALS — DIASTOLIC BLOOD PRESSURE: 75 MMHG | SYSTOLIC BLOOD PRESSURE: 160 MMHG

## 2018-01-13 VITALS — SYSTOLIC BLOOD PRESSURE: 135 MMHG | DIASTOLIC BLOOD PRESSURE: 69 MMHG

## 2018-01-13 VITALS — DIASTOLIC BLOOD PRESSURE: 55 MMHG | SYSTOLIC BLOOD PRESSURE: 122 MMHG

## 2018-01-13 VITALS — SYSTOLIC BLOOD PRESSURE: 149 MMHG | DIASTOLIC BLOOD PRESSURE: 74 MMHG

## 2018-01-13 VITALS — SYSTOLIC BLOOD PRESSURE: 134 MMHG | DIASTOLIC BLOOD PRESSURE: 64 MMHG

## 2018-01-13 RX ADMIN — CIPROFLOXACIN SCH MLS/HR: 2 INJECTION, SOLUTION INTRAVENOUS at 15:48

## 2018-01-13 RX ADMIN — DIVALPROEX SODIUM SCH MG: 500 TABLET, DELAYED RELEASE ORAL at 18:10

## 2018-01-13 RX ADMIN — CIPROFLOXACIN SCH MLS/HR: 2 INJECTION, SOLUTION INTRAVENOUS at 00:12

## 2018-01-13 RX ADMIN — CIPROFLOXACIN SCH MLS/HR: 2 INJECTION, SOLUTION INTRAVENOUS at 01:30

## 2018-01-13 RX ADMIN — Medication PRN MG: at 18:22

## 2018-01-13 RX ADMIN — CIPROFLOXACIN SCH MLS/HR: 2 INJECTION, SOLUTION INTRAVENOUS at 00:19

## 2018-01-13 RX ADMIN — Medication PRN MG: at 14:06

## 2018-01-13 RX ADMIN — Medication PRN MG: at 22:39

## 2018-01-13 RX ADMIN — DIVALPROEX SODIUM SCH MG: 500 TABLET, DELAYED RELEASE ORAL at 09:40

## 2018-01-13 RX ADMIN — SODIUM CHLORIDE PRN MLS/HR: 9 INJECTION, SOLUTION INTRAVENOUS at 22:45

## 2018-01-13 RX ADMIN — DIVALPROEX SODIUM SCH MG: 500 TABLET, DELAYED RELEASE ORAL at 13:07

## 2018-01-13 RX ADMIN — Medication SCH MLS/HR: at 18:10

## 2018-01-13 RX ADMIN — Medication SCH MLS/HR: at 13:06

## 2018-01-13 RX ADMIN — LOSARTAN POTASSIUM SCH MG: 50 TABLET, FILM COATED ORAL at 09:41

## 2018-01-13 RX ADMIN — Medication SCH MLS/HR: at 05:21

## 2018-01-13 RX ADMIN — SODIUM CHLORIDE PRN MLS/HR: 9 INJECTION, SOLUTION INTRAVENOUS at 02:06

## 2018-01-14 VITALS — SYSTOLIC BLOOD PRESSURE: 134 MMHG | DIASTOLIC BLOOD PRESSURE: 58 MMHG

## 2018-01-14 VITALS — DIASTOLIC BLOOD PRESSURE: 60 MMHG | SYSTOLIC BLOOD PRESSURE: 124 MMHG

## 2018-01-14 LAB
ALBUMIN SERPL BCP-MCNC: 2.8 G/DL (ref 3.4–5)
ALP SERPL-CCNC: 135 U/L (ref 46–116)
ALT SERPL W P-5'-P-CCNC: 32 U/L (ref 12–78)
AMYLASE SERPL-CCNC: 26 U/L (ref 25–115)
APTT PPP: 41 SEC (ref 23–34)
AST SERPL W P-5'-P-CCNC: 63 U/L (ref 15–37)
BASOPHILS # BLD AUTO: 0 /CMM (ref 0–0.2)
BASOPHILS NFR BLD AUTO: 0.5 % (ref 0–2)
BILIRUB DIRECT SERPL-MCNC: 0.1 MG/DL (ref 0–0.2)
BILIRUB SERPL-MCNC: 0.2 MG/DL (ref 0.2–1)
BUN SERPL-MCNC: 27 MG/DL (ref 7–18)
CALCIUM SERPL-MCNC: 8.5 MG/DL (ref 8.5–10.1)
CHLORIDE SERPL-SCNC: 105 MMOL/L (ref 98–107)
CHOLEST SERPL-MCNC: 152 MG/DL (ref ?–200)
CO2 SERPL-SCNC: 25 MMOL/L (ref 21–32)
CREAT SERPL-MCNC: 0.8 MG/DL (ref 0.6–1.3)
EOSINOPHIL # BLD AUTO: 0.1 /CMM (ref 0–0.7)
EOSINOPHIL NFR BLD AUTO: 2.1 % (ref 0–6)
GLUCOSE SERPL-MCNC: 122 MG/DL (ref 74–106)
HCT VFR BLD AUTO: 29 % (ref 33–45)
HDLC SERPL-MCNC: 67 MG/DL (ref 40–60)
HGB BLD-MCNC: 10 G/DL (ref 11.5–14.8)
INR PPP: 0.95 (ref 0.87–1.13)
IRON SERPL-MCNC: 50 UG/DL (ref 50–175)
LDLC SERPL DIRECT ASSAY-MCNC: 73 MG/DL (ref 0–99)
LIPASE SERPL-CCNC: 113 U/L (ref 73–393)
LYMPHOCYTES NFR BLD AUTO: 1.5 /CMM (ref 0.8–4.8)
LYMPHOCYTES NFR BLD AUTO: 37.7 % (ref 20–44)
MAGNESIUM SERPL-MCNC: 1.7 MG/DL (ref 1.8–2.4)
MCH RBC QN AUTO: 30 PG (ref 26–33)
MCHC RBC AUTO-ENTMCNC: 34 G/DL (ref 31–36)
MCV RBC AUTO: 87 FL (ref 82–100)
MONOCYTES NFR BLD AUTO: 0.3 /CMM (ref 0.1–1.3)
MONOCYTES NFR BLD AUTO: 7.5 % (ref 2–12)
NEUTROPHILS # BLD AUTO: 2 /CMM (ref 1.8–8.9)
NEUTROPHILS NFR BLD AUTO: 52.2 % (ref 43–81)
PHOSPHATE SERPL-MCNC: 3 MG/DL (ref 2.5–4.9)
PLATELET # BLD AUTO: 230 /CMM (ref 150–450)
POTASSIUM SERPL-SCNC: 4.9 MMOL/L (ref 3.5–5.1)
PREALB SERPL-MCNC: 20.2 MG/DL (ref 18–35.7)
PROT SERPL-MCNC: 6.6 G/DL (ref 6.4–8.2)
PROTHROMBIN TIME: 9.9 SECS (ref 9.5–12.7)
RBC # BLD AUTO: 3.34 MIL/UL (ref 4–5.2)
RDW COEFFICIENT OF VARIATION: 14.6 (ref 11.5–15)
RETICS/RBC NFR AUTO: 1.6 % (ref 0.6–2.5)
SODIUM SERPL-SCNC: 137 MMOL/L (ref 136–145)
TIBC SERPL-MCNC: 306 UG/DL (ref 250–450)
TRIGL SERPL-MCNC: 46 MG/DL (ref 30–150)
TSH SERPL DL<=0.005 MIU/L-ACNC: 2.96 UIU/ML (ref 0.36–3.74)
WBC NRBC COR # BLD AUTO: 3.9 K/UL (ref 4.3–11)

## 2018-01-14 RX ADMIN — DIVALPROEX SODIUM SCH MG: 500 TABLET, DELAYED RELEASE ORAL at 09:07

## 2018-01-14 RX ADMIN — CIPROFLOXACIN SCH MLS/HR: 2 INJECTION, SOLUTION INTRAVENOUS at 01:01

## 2018-01-14 RX ADMIN — Medication SCH MLS/HR: at 12:00

## 2018-01-14 RX ADMIN — Medication SCH MLS/HR: at 00:32

## 2018-01-14 RX ADMIN — Medication SCH MLS/HR: at 05:00

## 2018-01-14 RX ADMIN — MAGNESIUM SULFATE IN DEXTROSE SCH MLS/HR: 10 INJECTION, SOLUTION INTRAVENOUS at 10:35

## 2018-01-14 RX ADMIN — DIVALPROEX SODIUM SCH MG: 500 TABLET, DELAYED RELEASE ORAL at 13:30

## 2018-01-14 RX ADMIN — MAGNESIUM SULFATE IN DEXTROSE SCH MLS/HR: 10 INJECTION, SOLUTION INTRAVENOUS at 12:10

## 2018-01-14 RX ADMIN — LOSARTAN POTASSIUM SCH MG: 50 TABLET, FILM COATED ORAL at 09:07

## 2018-01-24 ENCOUNTER — HOSPITAL ENCOUNTER (EMERGENCY)
Dept: HOSPITAL 91 - E/R | Age: 73
Discharge: HOME | End: 2018-01-24
Payer: MEDICARE

## 2018-01-24 ENCOUNTER — HOSPITAL ENCOUNTER (EMERGENCY)
Age: 73
Discharge: HOME | End: 2018-01-24

## 2018-01-24 DIAGNOSIS — K57.32: Primary | ICD-10-CM

## 2018-01-24 DIAGNOSIS — I10: ICD-10-CM

## 2018-01-24 DIAGNOSIS — R39.2: ICD-10-CM

## 2018-01-24 LAB
ADD MAN DIFF?: NO
ADD UMIC: NO
ALANINE AMINOTRANSFERASE: 21 IU/L (ref 13–69)
ALBUMIN/GLOBULIN RATIO: 1.21
ALBUMIN: 4 G/DL (ref 3.3–4.9)
ALKALINE PHOSPHATASE: 58 IU/L (ref 42–121)
ANION GAP: 15 (ref 8–16)
ASPARTATE AMINO TRANSFERASE: 18 IU/L (ref 15–46)
BASOPHIL #: 0.1 10^3/UL (ref 0–0.1)
BASOPHILS %: 0.9 % (ref 0–2)
BILIRUBIN,DIRECT: 0 MG/DL (ref 0–0.2)
BILIRUBIN,TOTAL: 0 MG/DL (ref 0.2–1.3)
BLOOD UREA NITROGEN: 32 MG/DL (ref 7–20)
CALCIUM: 9.4 MG/DL (ref 8.4–10.2)
CARBON DIOXIDE: 24 MMOL/L (ref 21–31)
CHLORIDE: 105 MMOL/L (ref 97–110)
CREATININE: 1.07 MG/DL (ref 0.44–1)
EOSINOPHILS #: 0.2 10^3/UL (ref 0–0.5)
EOSINOPHILS %: 2.7 % (ref 0–7)
GLOBULIN: 3.3 G/DL (ref 1.3–3.2)
GLUCOSE: 113 MG/DL (ref 70–220)
HEMATOCRIT: 34.5 % (ref 37–47)
HEMOGLOBIN: 11.1 G/DL (ref 12–16)
LIPASE: 133 U/L (ref 23–300)
LYMPHOCYTES #: 2.4 10^3/UL (ref 0.8–2.9)
LYMPHOCYTES %: 36.3 % (ref 15–51)
MEAN CORPUSCULAR HEMOGLOBIN: 29.1 PG (ref 29–33)
MEAN CORPUSCULAR HGB CONC: 32.2 G/DL (ref 32–37)
MEAN CORPUSCULAR VOLUME: 90.6 FL (ref 82–101)
MEAN PLATELET VOLUME: 10.5 FL (ref 7.4–10.4)
MONOCYTE #: 0.5 10^3/UL (ref 0.3–0.9)
MONOCYTES %: 7.5 % (ref 0–11)
NEUTROPHIL #: 3.4 10^3/UL (ref 1.6–7.5)
NEUTROPHILS %: 52.4 % (ref 39–77)
NUCLEATED RED BLOOD CELLS #: 0 10^3/UL (ref 0–0)
NUCLEATED RED BLOOD CELLS%: 0 /100WBC (ref 0–0)
PLATELET COUNT: 253 10^3/UL (ref 140–415)
POTASSIUM: 5.2 MMOL/L (ref 3.5–5.1)
RED BLOOD COUNT: 3.81 10^6/UL (ref 4.2–5.4)
RED CELL DISTRIBUTION WIDTH: 13.3 % (ref 11.5–14.5)
SODIUM: 139 MMOL/L (ref 135–144)
TOTAL PROTEIN: 7.3 G/DL (ref 6.1–8.1)
UR ASCORBIC ACID: NEGATIVE MG/DL
UR BILIRUBIN (DIP): NEGATIVE MG/DL
UR BLOOD (DIP): NEGATIVE MG/DL
UR CLARITY: CLEAR
UR COLOR: YELLOW
UR GLUCOSE (DIP): NEGATIVE MG/DL
UR KETONES (DIP): (no result) MG/DL
UR LEUKOCYTE ESTERASE (DIP): NEGATIVE LEU/UL
UR NITRITE (DIP): NEGATIVE MG/DL
UR PH (DIP): 6 (ref 5–9)
UR SPECIFIC GRAVITY (DIP): 1.02 (ref 1–1.03)
UR TOTAL PROTEIN (DIP): NEGATIVE MG/DL
UR UROBILINOGEN (DIP): NEGATIVE MG/DL
WHITE BLOOD COUNT: 6.6 10^3/UL (ref 4.8–10.8)

## 2018-01-24 PROCEDURE — 99285 EMERGENCY DEPT VISIT HI MDM: CPT

## 2018-01-24 PROCEDURE — 74176 CT ABD & PELVIS W/O CONTRAST: CPT

## 2018-01-24 PROCEDURE — 83690 ASSAY OF LIPASE: CPT

## 2018-01-24 PROCEDURE — 81003 URINALYSIS AUTO W/O SCOPE: CPT

## 2018-01-24 PROCEDURE — 96374 THER/PROPH/DIAG INJ IV PUSH: CPT

## 2018-01-24 PROCEDURE — 96361 HYDRATE IV INFUSION ADD-ON: CPT

## 2018-01-24 PROCEDURE — 96372 THER/PROPH/DIAG INJ SC/IM: CPT

## 2018-01-24 PROCEDURE — 85025 COMPLETE CBC W/AUTO DIFF WBC: CPT

## 2018-01-24 PROCEDURE — 36415 COLL VENOUS BLD VENIPUNCTURE: CPT

## 2018-01-24 PROCEDURE — 80053 COMPREHEN METABOLIC PANEL: CPT

## 2018-01-24 RX ADMIN — CIPROFLOXACIN HYDROCHLORIDE 1 MG: 500 TABLET, FILM COATED ORAL at 22:36

## 2018-01-24 RX ADMIN — LIDOCAINE HYDROCHLORIDE 1 MLS/HR: 10 INJECTION, SOLUTION EPIDURAL; INFILTRATION; INTRACAUDAL; PERINEURAL at 21:55

## 2018-01-24 RX ADMIN — LIDOCAINE HYDROCHLORIDE 1 MLS/HR: 10 INJECTION, SOLUTION EPIDURAL; INFILTRATION; INTRACAUDAL; PERINEURAL at 20:30

## 2018-01-24 RX ADMIN — DICYCLOMINE HYDROCHLORIDE 1 MG: 20 INJECTION, SOLUTION INTRAMUSCULAR at 21:34

## 2019-01-07 ENCOUNTER — HOSPITAL ENCOUNTER (OUTPATIENT)
Dept: HOSPITAL 54 - WOU | Age: 74
Discharge: NURSING, REHAB OR LONG-TERM CARE | End: 2019-01-07
Attending: SURGERY
Payer: MEDICARE

## 2019-01-07 DIAGNOSIS — Z93.2: ICD-10-CM

## 2019-01-07 DIAGNOSIS — E66.09: ICD-10-CM

## 2019-01-07 DIAGNOSIS — R73.03: ICD-10-CM

## 2019-01-07 DIAGNOSIS — L03.311: Primary | ICD-10-CM

## 2019-01-07 DIAGNOSIS — G40.909: ICD-10-CM

## 2019-01-07 DIAGNOSIS — Z87.891: ICD-10-CM

## 2019-01-07 DIAGNOSIS — I11.0: ICD-10-CM

## 2019-01-07 DIAGNOSIS — I50.9: ICD-10-CM

## 2019-01-07 PROCEDURE — G0463 HOSPITAL OUTPT CLINIC VISIT: HCPCS

## 2019-01-07 PROCEDURE — A6402 STERILE GAUZE <= 16 SQ IN: HCPCS

## 2019-01-18 ENCOUNTER — HOSPITAL ENCOUNTER (OUTPATIENT)
Dept: HOSPITAL 54 - CT | Age: 74
Discharge: HOME | End: 2019-01-18
Attending: INTERNAL MEDICINE
Payer: MEDICARE

## 2019-01-18 DIAGNOSIS — Z98.890: ICD-10-CM

## 2019-01-18 DIAGNOSIS — Z90.49: ICD-10-CM

## 2019-01-18 DIAGNOSIS — N20.0: ICD-10-CM

## 2019-01-18 DIAGNOSIS — K91.89: Primary | ICD-10-CM

## 2019-01-18 DIAGNOSIS — I70.0: ICD-10-CM

## 2019-01-18 PROCEDURE — 74177 CT ABD & PELVIS W/CONTRAST: CPT
